# Patient Record
Sex: FEMALE | Race: WHITE | URBAN - METROPOLITAN AREA
[De-identification: names, ages, dates, MRNs, and addresses within clinical notes are randomized per-mention and may not be internally consistent; named-entity substitution may affect disease eponyms.]

---

## 2017-10-26 ENCOUNTER — HISTORICAL (OUTPATIENT)
Dept: LAB | Facility: HOSPITAL | Age: 82
End: 2017-10-26

## 2017-10-28 LAB — FINAL CULTURE: NO GROWTH

## 2018-12-11 ENCOUNTER — HISTORICAL (OUTPATIENT)
Dept: LAB | Facility: HOSPITAL | Age: 83
End: 2018-12-11

## 2018-12-11 ENCOUNTER — HISTORICAL (OUTPATIENT)
Dept: ADMINISTRATIVE | Facility: HOSPITAL | Age: 83
End: 2018-12-11

## 2018-12-11 LAB
APPEARANCE, UA: CLEAR
BACTERIA #/AREA URNS AUTO: ABNORMAL /HPF
BILIRUB UR QL STRIP: NEGATIVE MG/DL
COLOR UR: YELLOW
GLUCOSE (UA): NEGATIVE MG/DL
HGB UR QL STRIP: ABNORMAL UNIT/L
KETONES UR QL STRIP: NEGATIVE MG/DL
LEUKOCYTE ESTERASE UR QL STRIP: NEGATIVE UNIT/L
NITRITE UR QL STRIP.AUTO: NEGATIVE
PH UR STRIP: 7.5 [PH]
PROT UR QL STRIP: NEGATIVE MG/DL
RBC #/AREA URNS HPF: ABNORMAL /HPF
SP GR UR STRIP: 1.01
SQUAMOUS EPITHELIAL, UA: ABNORMAL /LPF
UROBILINOGEN UR STRIP-ACNC: 0.2 MG/DL
WBC #/AREA URNS AUTO: ABNORMAL /[HPF]

## 2018-12-13 LAB — FINAL CULTURE: NORMAL

## 2018-12-17 ENCOUNTER — HISTORICAL (OUTPATIENT)
Dept: ADMINISTRATIVE | Facility: HOSPITAL | Age: 83
End: 2018-12-17

## 2018-12-17 LAB
ABS NEUT (OLG): 3.8 X10(3)/MCL (ref 2.1–9.2)
ALBUMIN SERPL-MCNC: 4.3 GM/DL (ref 3.4–5)
ALBUMIN/GLOB SERPL: 1.72 {RATIO} (ref 1.5–2.5)
ALP SERPL-CCNC: 61 UNIT/L (ref 38–126)
ALT SERPL-CCNC: 13 UNIT/L (ref 7–52)
APPEARANCE, UA: CLEAR
AST SERPL-CCNC: 23 UNIT/L (ref 15–37)
BACTERIA #/AREA URNS AUTO: ABNORMAL /HPF
BILIRUB SERPL-MCNC: 0.6 MG/DL (ref 0.2–1)
BILIRUB UR QL STRIP: NEGATIVE MG/DL
BILIRUBIN DIRECT+TOT PNL SERPL-MCNC: 0.2 MG/DL (ref 0–0.5)
BILIRUBIN DIRECT+TOT PNL SERPL-MCNC: 0.4 MG/DL
BUN SERPL-MCNC: 13 MG/DL (ref 7–18)
CALCIUM SERPL-MCNC: 8.5 MG/DL (ref 8.5–10)
CHLORIDE SERPL-SCNC: 93 MMOL/L (ref 98–107)
CO2 SERPL-SCNC: 32 MMOL/L (ref 21–32)
COLOR UR: YELLOW
CREAT SERPL-MCNC: 0.59 MG/DL (ref 0.6–1.3)
ERYTHROCYTE [DISTWIDTH] IN BLOOD BY AUTOMATED COUNT: 13.5 % (ref 11.5–17)
GLOBULIN SER-MCNC: 2.5 GM/DL (ref 1.2–3)
GLUCOSE (UA): NEGATIVE MG/DL
GLUCOSE SERPL-MCNC: 115 MG/DL (ref 74–106)
HCT VFR BLD AUTO: 42.8 % (ref 37–47)
HGB BLD-MCNC: 13.5 GM/DL (ref 12–16)
HGB UR QL STRIP: ABNORMAL UNIT/L
KETONES UR QL STRIP: NEGATIVE MG/DL
LEUKOCYTE ESTERASE UR QL STRIP: NEGATIVE UNIT/L
LYMPHOCYTES # BLD AUTO: 1.6 X10(3)/MCL (ref 0.6–3.4)
LYMPHOCYTES NFR BLD AUTO: 26.6 % (ref 13–40)
MCH RBC QN AUTO: 32.2 PG (ref 27–31.2)
MCHC RBC AUTO-ENTMCNC: 32 GM/DL (ref 32–36)
MCV RBC AUTO: 102 FL (ref 80–94)
MONOCYTES # BLD AUTO: 0.6 X10(3)/MCL (ref 0.1–1.3)
MONOCYTES NFR BLD AUTO: 10.3 % (ref 0.1–24)
NEUTROPHILS NFR BLD AUTO: 63.1 % (ref 47–80)
NITRITE UR QL STRIP.AUTO: NEGATIVE
PH UR STRIP: 7 [PH]
PLATELET # BLD AUTO: 173 X10(3)/MCL (ref 130–400)
PMV BLD AUTO: 8.6 FL (ref 9.4–12.4)
POTASSIUM SERPL-SCNC: 3.9 MMOL/L (ref 3.5–5.1)
PROT SERPL-MCNC: 6.8 GM/DL (ref 6.4–8.2)
PROT UR QL STRIP: NEGATIVE MG/DL
RBC # BLD AUTO: 4.19 X10(6)/MCL (ref 4.2–5.4)
RBC #/AREA URNS HPF: ABNORMAL /HPF
SODIUM SERPL-SCNC: 129 MMOL/L (ref 136–145)
SP GR UR STRIP: 1.01
SQUAMOUS EPITHELIAL, UA: ABNORMAL /LPF
UROBILINOGEN UR STRIP-ACNC: 1 MG/DL
WBC # SPEC AUTO: 6 X10(3)/MCL (ref 4.5–11.5)
WBC #/AREA URNS AUTO: ABNORMAL /[HPF]

## 2018-12-18 ENCOUNTER — HISTORICAL (OUTPATIENT)
Dept: LAB | Facility: HOSPITAL | Age: 83
End: 2018-12-18

## 2018-12-20 LAB — FINAL CULTURE: NO GROWTH

## 2019-03-21 ENCOUNTER — HISTORICAL (OUTPATIENT)
Dept: ADMINISTRATIVE | Facility: HOSPITAL | Age: 84
End: 2019-03-21

## 2019-03-21 ENCOUNTER — HISTORICAL (OUTPATIENT)
Dept: LAB | Facility: HOSPITAL | Age: 84
End: 2019-03-21

## 2019-03-21 LAB
ABS NEUT (OLG): 4.6 X10(3)/MCL (ref 2.1–9.2)
ALBUMIN SERPL-MCNC: 3.6 GM/DL (ref 3.4–5)
ALBUMIN/GLOB SERPL: 1.71 {RATIO} (ref 1.5–2.5)
ALP SERPL-CCNC: 75 UNIT/L (ref 38–126)
ALT SERPL-CCNC: 25 UNIT/L (ref 7–52)
AST SERPL-CCNC: 24 UNIT/L (ref 15–37)
BILIRUB SERPL-MCNC: 0.8 MG/DL (ref 0.2–1)
BILIRUBIN DIRECT+TOT PNL SERPL-MCNC: 0.2 MG/DL (ref 0–0.5)
BILIRUBIN DIRECT+TOT PNL SERPL-MCNC: 0.6 MG/DL
BNP BLD-MCNC: 772 PG/ML (ref 0–125)
BUN SERPL-MCNC: 20 MG/DL (ref 7–18)
CALCIUM SERPL-MCNC: 8 MG/DL (ref 8.5–10)
CHLORIDE SERPL-SCNC: 92 MMOL/L (ref 98–107)
CO2 SERPL-SCNC: 30 MMOL/L (ref 21–32)
CREAT SERPL-MCNC: 0.49 MG/DL (ref 0.6–1.3)
DIGOXIN SERPL-MCNC: 0.2 NG/ML (ref 0.9–2)
ERYTHROCYTE [DISTWIDTH] IN BLOOD BY AUTOMATED COUNT: 14.1 % (ref 11.5–17)
GLOBULIN SER-MCNC: 2.1 GM/DL (ref 1.2–3)
GLUCOSE SERPL-MCNC: 104 MG/DL (ref 74–106)
HCT VFR BLD AUTO: 37.3 % (ref 37–47)
HGB BLD-MCNC: 13 GM/DL (ref 12–16)
LYMPHOCYTES # BLD AUTO: 1 X10(3)/MCL (ref 0.6–3.4)
LYMPHOCYTES NFR BLD AUTO: 16.5 % (ref 13–40)
MCH RBC QN AUTO: 32.7 PG (ref 27–31.2)
MCHC RBC AUTO-ENTMCNC: 35 GM/DL (ref 32–36)
MCV RBC AUTO: 94 FL (ref 80–94)
MONOCYTES # BLD AUTO: 0.5 X10(3)/MCL (ref 0.1–1.3)
MONOCYTES NFR BLD AUTO: 7.6 % (ref 0.1–24)
NEUTROPHILS NFR BLD AUTO: 75.9 % (ref 47–80)
PLATELET # BLD AUTO: 176 X10(3)/MCL (ref 130–400)
PMV BLD AUTO: 8.8 FL (ref 9.4–12.4)
POTASSIUM SERPL-SCNC: 3.8 MMOL/L (ref 3.5–5.1)
PROT SERPL-MCNC: 5.7 GM/DL (ref 6.4–8.2)
RBC # BLD AUTO: 3.97 X10(6)/MCL (ref 4.2–5.4)
SODIUM SERPL-SCNC: 126 MMOL/L (ref 136–145)
WBC # SPEC AUTO: 6.1 X10(3)/MCL (ref 4.5–11.5)

## 2022-04-10 ENCOUNTER — HISTORICAL (OUTPATIENT)
Dept: ADMINISTRATIVE | Facility: HOSPITAL | Age: 87
End: 2022-04-10

## 2022-04-11 ENCOUNTER — HISTORICAL (OUTPATIENT)
Dept: ADMINISTRATIVE | Facility: HOSPITAL | Age: 87
End: 2022-04-11

## 2022-04-28 VITALS
WEIGHT: 130.5 LBS | BODY MASS INDEX: 23.12 KG/M2 | SYSTOLIC BLOOD PRESSURE: 122 MMHG | DIASTOLIC BLOOD PRESSURE: 80 MMHG | HEIGHT: 63 IN

## 2022-04-28 VITALS
HEIGHT: 63 IN | DIASTOLIC BLOOD PRESSURE: 80 MMHG | SYSTOLIC BLOOD PRESSURE: 122 MMHG | BODY MASS INDEX: 23.12 KG/M2 | WEIGHT: 130.5 LBS

## 2022-05-03 NOTE — HISTORICAL [facility] CERNER
This is a historical note converted from Alena. Formatting and pictures may have been removed.  Please reference Alena for original formatting and attached multimedia. Chief Complaint  CPX  History of Present Illness  Yearly exam.? Here with son and daughter-in-law.? Recently?hospitalized with?vomiting and diarrhea dehydration?and a urinary tract infection.? Was on Cipro.? Since returning to nursing home?she has been confused?and weak.? Now has 24-hour care?to help dress.? Son?feels she is anxious.? She is on?sertraline 50 mg 1/2 tablet?and will increase it to a full tablet.? She does rarely use lorazepam?on an as-needed basis.? She does take doxepin 10 mg at nighttime.  Review of Systems  HEENT -?Dr Valencia? eye exam s/p cataracts-knees exam.  ??? Hearing loss  CHEST? - pneumonia 2014;? pleural effuision 2016  C/V - Dr. Mercedes -q 6 mos -?atrial fib/HBP/cardiomyopathy  GI? - Dr. Stephens colonoscope polyp 2005 repeat prn  ??? Dr. DEONTE Vidal - gastritis  ??? 11/30/18 ER vomiting.   - Dr. Beth - urinary retention  ??? 12/10/18 u/a & culture - normal.  M/S - arthritis  DEPRESSION  OSTEOPENIA - DEXA 4/23/09  GYN - Dr. Piper atrophic vaginitis  LAST CPX 12/17/18  Physical Exam  Vitals & Measurements  HR:?68(Peripheral)? BP:?120/70?  HT:?160.02?cm? HT:?160.02?cm? WT:?55.6?kg? WT:?55.6?kg? BMI:?21.71?  GEN?89-year-old white female.? She looks very good.? She was alert talkative when I walked in.? Later during the exam?she tended to doze off.? She ambulates with rolling walker.  SKIN?clear  PULSE?regular  HEENT?normal  NECK?no bruits  THYROID?normal  CHEST?clear  HEART?regular rate and rhythm without murmur  ABDOMEN?soft nontender no masses  EXTREMITIES?no edema  Assessment/Plan  1.?Medicare annual wellness visit, subsequent?Z00.00  ?We will increase sertraline to 50 mg 1 daily.? CBC, CMP,?UA and urine CNS?obtained.  Ordered:   Medicare Subsequent Wellness PC, Medicare annual wellness visit, subsequent   HTN (hypertension)  Acute UTI, [facility] - [facility], 12/17/18 14:06:00 CST  ?  2.?HTN (hypertension)?I10  Ordered:  Automated Diff, Routine collect, 12/17/18 14:08:00 CST, Blood, Collected, Stop date 12/17/18 14:08:00 CST, Lab Collect, HTN (hypertension), 12/17/18 14:08:00 CST  CBC w/ Auto Diff, Routine collect, 12/17/18 14:08:00 CST, Blood, Stop date 12/17/18 14:08:00 CST, Lab Collect, HTN (hypertension), 12/17/18 14:08:00 CST  Comprehensive Metabolic Panel, Routine collect, 12/17/18 14:08:00 CST, Blood, Stop date 12/17/18 14:08:00 CST, Lab Collect, HTN (hypertension), 12/17/18 14:08:00 CST   Medicare Subsequent Wellness PC, Medicare annual wellness visit, subsequent  HTN (hypertension)  Acute UTI, [facility], 12/17/18 14:06:00 CST  Lab Collection Request, 12/17/18 14:06:00 CST, [facility], 12/17/18 14:06:00 CST  ?  3.?Acute UTI?N39.0  Ordered:   Medicare Subsequent Wellness PC, Medicare annual wellness visit, subsequent  HTN (hypertension)  Acute UTI, [facility], 12/17/18 14:06:00 CST  Lab Collection Request, 12/17/18 14:06:00 CST, [facility], 12/17/18 14:06:00 CST  Urinalysis Complete no reflex, Routine collect, Urine, 12/17/18 14:08:00 CST, Stop date 12/17/18 14:08:00 CST, Nurse collect, Acute UTI  Urine Culture [id number], Routine collect, 12/17/18 14:06:00 CST, Order for future visit, Urine, Stop date 12/17/18 14:06:00 CST, Acute UTI  ?   Problem List/Past Medical History  Ongoing  AF (atrial fibrillation)  Arthritis  Atrophic vaginitis  Cardiomyopathy  Colon polyp  Depression  Hearing loss  HTN (hypertension)  Osteopenia  Historical  No qualifying data  Procedure/Surgical History  Colonoscopy (2005)  Appendectomy  cataract removal, bilaterally  Cholecystectomy  Dental implant maintenance procedure  Hemorrhoidectomy   Medications  Allegra D OTC 24HR 180 mg-240 mg oral tablet, extended release, 1 tab(s), Oral, Daily  aspirin 81 mg oral Delayed Release (EC) tablet, 81 mg= 1 tab(s), Oral,  Daily  azelastine 0.05% ophthalmic solution, See Instructions, 11 refills  carvedilol 6.25 mg oral tablet, 6.25 mg= 1 tab(s), Oral, BID  digoxin 125 mcg (0.125 mg) oral tablet, 125 mcg= 1 tab(s), Oral, M,TH  doxepin 10 mg oral capsule, 10 mg= 1 cap(s), Oral, Once a day (at bedtime)  fluticasone 27.5 mcg/inh nasal spray, 2 spray(s), Nasal, Daily, PRN  hydrOXYzine hydrochloride 10 mg oral tablet, 10 mg= 1 tab(s), Oral, BID  hyoscyamine 0.125 mg sublingual tablet, 0.125 mg= 1 tab(s), SL, TID  LORazepam 0.5 mg oral tablet, 0.5 mg= 1 tab(s), Oral, Daily  Norvasc 2.5 mg oral tablet, 2.5 mg= 1 tab(s), Oral, Daily  polyethylene glycol 3350 oral powder for reconstitution, See Instructions, 11 refills  SERTRALINE TAB 50MG, 50 mg= 1 tab(s), Oral, Daily  Vitamin D, 1000 units, Oral, Daily  Allergies  Benadryl  sulfa drugs  Social History  Alcohol - Low Risk, 04/24/2012  Current, Wine, 1-2 times per year, 04/24/2012  Employment/School  Retired, 08/14/2018  Home/Environment  Lives with [facility] at [facility], Assisted living.., 08/14/2018  Substance Abuse - No Risk, 04/24/2012  Tobacco - Low Risk, 04/24/2012  Former smoker, No, 12/17/2018  Past, Cigarettes, 04/24/2012  Family History  Blood clot: Father.  Coronary artery disease: Brother.  Diabetes mellitus type 2: Mother.  Primary malignant neoplasm of lung: Brother.  Immunizations  Vaccine Date Status   influenza virus vaccine, inactivated 10/10/2018 Recorded   influenza virus vaccine, inactivated 10/04/2017 Recorded   tetanus-diphth toxoids (Td) adult/adol 08/15/2014 Recorded   pneumococcal 13-valent conjugate vaccine 07/23/2014 Recorded   pneumococcal 23-polyvalent vaccine 2005 Recorded   Health Maintenance  Health Maintenance  ???Pending?(in the next year)  ??? ??OverDue  ??? ? ? ?Pneumococcal Vaccine due??and every?  ??? ? ? ?Hypertension Management-BMP due??06/16/16??and every 1??year(s)  ??? ? ? ?Advance Directive due??06/16/16??and every 1??year(s)  ??? ??Due?  ??? ? ? ?Bone  Density Screening due??12/17/18??Variable frequency  ??? ? ? ?Fall Risk Assessment due??12/17/18??and every 1??year(s)  ??? ? ? ?Functional Assessment due??12/17/18??and every 1??year(s)  ??? ? ? ?Geriatric Depression Screening due??12/17/18??and every 1??year(s)  ??? ? ? ?Zoster Vaccine due??12/17/18??and every 100??year(s)  ???Satisfied?(in the past 1 year)  ??? ??Satisfied?  ??? ? ? ?ADL Screening on??12/17/18.??Satisfied by Kamryn Briones LPN  ??? ? ? ?Blood Pressure Screening on??12/17/18.??Satisfied by Kamryn Briones LPN  ??? ? ? ?Body Mass Index Check on??12/17/18.??Satisfied by Kamryn Briones LPN  ??? ? ? ?Cognitive Screening on??12/17/18.??Satisfied by Kamryn Briones LPN  ??? ? ? ?Hypertension Management-Blood Pressure on??12/17/18.??Satisfied by Kamryn Briones LPN  ??? ? ? ?Influenza Vaccine on??10/10/18.??Satisfied by Kamryn Briones LPN  ??? ? ? ?Obesity Screening on??12/17/18.??Satisfied by Kamryn Briones LPN  ?  ?      12/17/18 LAB:? CBC, U/A - NORMAL.? URINE CULTURE - NO GROWTH.? CMP - GLUCOSE 115. NA-129.